# Patient Record
Sex: FEMALE | Employment: FULL TIME | URBAN - METROPOLITAN AREA
[De-identification: names, ages, dates, MRNs, and addresses within clinical notes are randomized per-mention and may not be internally consistent; named-entity substitution may affect disease eponyms.]

---

## 2018-01-25 ENCOUNTER — HOSPITAL ENCOUNTER (OUTPATIENT)
Dept: MAMMOGRAPHY | Age: 48
Discharge: HOME | End: 2018-01-25
Attending: FAMILY MEDICINE
Payer: COMMERCIAL

## 2018-01-25 DIAGNOSIS — Z12.31 SCREENING MAMMOGRAM, ENCOUNTER FOR: ICD-10-CM

## 2018-01-25 PROCEDURE — 77067 SCR MAMMO BI INCL CAD: CPT | Performed by: FAMILY MEDICINE

## 2018-01-25 PROCEDURE — 77063 BREAST TOMOSYNTHESIS BI: CPT | Performed by: FAMILY MEDICINE

## 2018-02-01 ENCOUNTER — HOSPITAL ENCOUNTER (OUTPATIENT)
Dept: MAMMOGRAPHY | Facility: HOSPITAL | Age: 48
Discharge: HOME | End: 2018-02-01
Attending: FAMILY MEDICINE
Payer: COMMERCIAL

## 2018-02-01 DIAGNOSIS — R92.2 INCONCLUSIVE MAMMOGRAM: ICD-10-CM

## 2018-02-01 PROCEDURE — 76642 ULTRASOUND BREAST LIMITED: CPT | Performed by: FAMILY MEDICINE

## 2018-02-01 PROCEDURE — 77065 DX MAMMO INCL CAD UNI: CPT | Performed by: FAMILY MEDICINE

## 2018-02-01 PROCEDURE — 77061 BREAST TOMOSYNTHESIS UNI: CPT | Performed by: FAMILY MEDICINE

## 2018-02-01 NOTE — IMAGING NOTE
Asssisted Dr. Karlee Montes with recommendation for a left stereotactic 2 site breast biopsy for calcifications. Emotional and educational support provided. Written information provided to [place].  Our breast center schedulers will call pt within 72 hours to

## 2018-02-08 ENCOUNTER — HOSPITAL ENCOUNTER (OUTPATIENT)
Dept: MAMMOGRAPHY | Facility: HOSPITAL | Age: 48
Discharge: HOME | End: 2018-02-08
Attending: FAMILY MEDICINE
Payer: COMMERCIAL

## 2018-02-08 DIAGNOSIS — R92.1 BREAST CALCIFICATIONS: ICD-10-CM

## 2018-02-08 PROCEDURE — 19082 BX BREAST ADD LESION STRTCTC: CPT | Performed by: FAMILY MEDICINE

## 2018-02-08 PROCEDURE — 19081 BX BREAST 1ST LESION STRTCTC: CPT | Performed by: FAMILY MEDICINE

## 2018-02-08 PROCEDURE — 88305 TISSUE EXAM BY PATHOLOGIST: CPT

## 2018-02-12 ENCOUNTER — TELEPHONE (OUTPATIENT)
Dept: MAMMOGRAPHY | Facility: HOSPITAL | Age: 48
End: 2018-02-12

## 2018-02-12 NOTE — TELEPHONE ENCOUNTER
Spoke with pt's ordering doctor regarding new breast pathology of ADH in 1 area sampled. The other site was negative. She will contact pt with results and refer her to a surgeon.

## 2018-03-02 ENCOUNTER — OFFICE VISIT (OUTPATIENT)
Dept: SURGERY | Facility: CLINIC | Age: 48
End: 2018-03-02

## 2018-03-02 VITALS
DIASTOLIC BLOOD PRESSURE: 85 MMHG | HEART RATE: 56 BPM | TEMPERATURE: 99 F | HEIGHT: 64.5 IN | RESPIRATION RATE: 20 BRPM | SYSTOLIC BLOOD PRESSURE: 157 MMHG | BODY MASS INDEX: 29.82 KG/M2 | OXYGEN SATURATION: 100 % | WEIGHT: 176.81 LBS

## 2018-03-02 DIAGNOSIS — N60.92 ATYPICAL DUCTAL HYPERPLASIA OF LEFT BREAST: Primary | ICD-10-CM

## 2018-03-02 PROCEDURE — 99245 OFF/OP CONSLTJ NEW/EST HI 55: CPT | Performed by: SURGERY

## 2018-03-12 ENCOUNTER — TELEPHONE (OUTPATIENT)
Dept: MAMMOGRAPHY | Facility: HOSPITAL | Age: 48
End: 2018-03-12

## 2018-03-12 NOTE — TELEPHONE ENCOUNTER
Phoned Kendall Dominguez regarding MAG SEED localization process of breast for lumpectomy scheduled for 3/15/18 with Dr. Zoë Brown. Procedure explained and all questions answered. Pt to be transported via W/C through [facility] to [facility] in [facility].  Pt verbalized u

## 2018-03-15 ENCOUNTER — HOSPITAL ENCOUNTER (OUTPATIENT)
Facility: HOSPITAL | Age: 48
Setting detail: HOSPITAL OUTPATIENT SURGERY
Discharge: HOME | End: 2018-03-15
Attending: SURGERY | Admitting: SURGERY
Payer: COMMERCIAL

## 2018-03-15 ENCOUNTER — ANESTHESIA EVENT (OUTPATIENT)
Dept: SURGERY | Facility: HOSPITAL | Age: 48
End: 2018-03-15

## 2018-03-15 ENCOUNTER — ANESTHESIA (OUTPATIENT)
Dept: SURGERY | Facility: HOSPITAL | Age: 48
End: 2018-03-15

## 2018-03-15 ENCOUNTER — HOSPITAL ENCOUNTER (OUTPATIENT)
Dept: MAMMOGRAPHY | Facility: HOSPITAL | Age: 48
Discharge: HOME | End: 2018-03-15
Attending: SURGERY
Payer: COMMERCIAL

## 2018-03-15 ENCOUNTER — SURGERY (OUTPATIENT)
Age: 48
End: 2018-03-15

## 2018-03-15 VITALS
TEMPERATURE: 98 F | RESPIRATION RATE: 20 BRPM | DIASTOLIC BLOOD PRESSURE: 56 MMHG | HEART RATE: 51 BPM | SYSTOLIC BLOOD PRESSURE: 112 MMHG | WEIGHT: 176.81 LBS | BODY MASS INDEX: 29.82 KG/M2 | HEIGHT: 64.5 IN | OXYGEN SATURATION: 100 %

## 2018-03-15 DIAGNOSIS — N60.99 ATYPICAL DUCTAL HYPERPLASIA OF BREAST: ICD-10-CM

## 2018-03-15 DIAGNOSIS — N60.92 ATYPICAL DUCTAL HYPERPLASIA OF LEFT BREAST: ICD-10-CM

## 2018-03-15 PROCEDURE — A4648 IMPLANTABLE TISSUE MARKER: HCPCS

## 2018-03-15 PROCEDURE — 88307 TISSUE EXAM BY PATHOLOGIST: CPT | Performed by: SURGERY

## 2018-03-15 PROCEDURE — 76098 X-RAY EXAM SURGICAL SPECIMEN: CPT | Performed by: SURGERY

## 2018-03-15 PROCEDURE — 0HBU0ZX EXCISION OF LEFT BREAST, OPEN APPROACH, DIAGNOSTIC: ICD-10-PCS | Performed by: SURGERY

## 2018-03-15 PROCEDURE — 19281 PERQ DEVICE BREAST 1ST IMAG: CPT | Performed by: SURGERY

## 2018-03-15 PROCEDURE — 81025 URINE PREGNANCY TEST: CPT | Performed by: SURGERY

## 2018-03-15 RX ORDER — CEFAZOLIN SODIUM/WATER 2 G/20 ML
2 SYRINGE (ML) INTRAVENOUS ONCE
Status: DISCONTINUED | OUTPATIENT
Start: 2018-03-15 | End: 2018-03-15 | Stop reason: HOSPADM

## 2018-03-15 RX ORDER — HYDROCODONE BITARTRATE AND ACETAMINOPHEN 5; 325 MG/1; MG/1
1-2 TABLET ORAL EVERY 6 HOURS PRN
Qty: 30 TABLET | Refills: 0 | Status: SHIPPED | OUTPATIENT
Start: 2018-03-15 | End: 2018-03-21 | Stop reason: ALTCHOICE

## 2018-03-15 RX ORDER — MORPHINE SULFATE 2 MG/ML
2 INJECTION, SOLUTION INTRAMUSCULAR; INTRAVENOUS EVERY 5 MIN PRN
Status: DISCONTINUED | OUTPATIENT
Start: 2018-03-15 | End: 2018-03-15

## 2018-03-15 RX ORDER — NALOXONE HYDROCHLORIDE 0.4 MG/ML
80 INJECTION, SOLUTION INTRAMUSCULAR; INTRAVENOUS; SUBCUTANEOUS AS NEEDED
Status: DISCONTINUED | OUTPATIENT
Start: 2018-03-15 | End: 2018-03-15

## 2018-03-15 RX ORDER — LIDOCAINE HYDROCHLORIDE AND EPINEPHRINE 10; 10 MG/ML; UG/ML
INJECTION, SOLUTION INFILTRATION; PERINEURAL AS NEEDED
Status: DISCONTINUED | OUTPATIENT
Start: 2018-03-15 | End: 2018-03-15 | Stop reason: HOSPADM

## 2018-03-15 RX ORDER — DEXAMETHASONE SODIUM PHOSPHATE 4 MG/ML
4 VIAL (ML) INJECTION AS NEEDED
Status: DISCONTINUED | OUTPATIENT
Start: 2018-03-15 | End: 2018-03-15

## 2018-03-15 RX ORDER — HYDROCODONE BITARTRATE AND ACETAMINOPHEN 5; 325 MG/1; MG/1
2 TABLET ORAL AS NEEDED
Status: DISCONTINUED | OUTPATIENT
Start: 2018-03-15 | End: 2018-03-15

## 2018-03-15 RX ORDER — BUPIVACAINE HYDROCHLORIDE 5 MG/ML
INJECTION, SOLUTION EPIDURAL; INTRACAUDAL AS NEEDED
Status: DISCONTINUED | OUTPATIENT
Start: 2018-03-15 | End: 2018-03-15 | Stop reason: HOSPADM

## 2018-03-15 RX ORDER — ONDANSETRON 2 MG/ML
4 INJECTION INTRAMUSCULAR; INTRAVENOUS AS NEEDED
Status: DISCONTINUED | OUTPATIENT
Start: 2018-03-15 | End: 2018-03-15

## 2018-03-15 RX ORDER — HYDROCODONE BITARTRATE AND ACETAMINOPHEN 5; 325 MG/1; MG/1
1 TABLET ORAL AS NEEDED
Status: DISCONTINUED | OUTPATIENT
Start: 2018-03-15 | End: 2018-03-15

## 2018-03-15 RX ORDER — SODIUM CHLORIDE, SODIUM LACTATE, POTASSIUM CHLORIDE, CALCIUM CHLORIDE 600; 310; 30; 20 MG/100ML; MG/100ML; MG/100ML; MG/100ML
INJECTION, SOLUTION INTRAVENOUS CONTINUOUS
Status: DISCONTINUED | OUTPATIENT
Start: 2018-03-15 | End: 2018-03-15

## 2018-03-15 RX ORDER — METOCLOPRAMIDE HYDROCHLORIDE 5 MG/ML
10 INJECTION INTRAMUSCULAR; INTRAVENOUS AS NEEDED
Status: DISCONTINUED | OUTPATIENT
Start: 2018-03-15 | End: 2018-03-15

## 2018-03-15 RX ORDER — DIAZEPAM 5 MG/1
5 TABLET ORAL AS NEEDED
Status: DISCONTINUED | OUTPATIENT
Start: 2018-03-15 | End: 2018-03-15 | Stop reason: HOSPADM

## 2018-03-15 NOTE — ANESTHESIA PREPROCEDURE EVALUATION
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

## 2018-03-15 NOTE — OPERATIVE REPORT
[facility]    PATIENT'S NAME: Sheryl BAUTISTA 40 PHYSICIAN: Ian Knutson M.D. OPERATING PHYSICIAN: Ian Schwarz. Roni Knutson M.D.    PATIENT ACCOUNT#:   [de-identified]    LOCATION:  [address] [place] [address] 8 EDWP 10  MEDICAL RECORD #:   [id number] 1% with epinephrine was used to infiltrate the skin and subcutaneous tissues at the incision site. A curvilinear superior areolar incision was made with a 15 blade knife in the skin.   Using a probe, the magnetic seed was identified and brought into the fi

## 2018-03-15 NOTE — PROGRESS NOTES
Breast Surgery New Patient Consultation    This is the first visit for this 56year old woman, referred by Dr. Lily Ferris, who presents for evaluation of abnormal imaging.     History of Present Illness:   Ms. Liz Rojo is a 56year old woman who presen Maternal Grandmother 66     colon   • Cancer Maternal Grandfather [de-identified]     prostate   • Cancer Paternal Grandfather 76     lung   • Cancer Paternal Uncle 71     colon   • Cancer Maternal Aunt 46     dcis       She is not of Ashkenazi Catholic ancestry.     Soci stream, blood in the urine or vaginal/penile discharge. Skin:    The patient denies rash, itching, skin lesions, dry skin, change in skin color or change in moles.      Hematologic/Lymphatic:  The patient denies easily bruising or bleeding or persistent breast: The skin, nipple ,and areola appear normal. There is no skin dimpling with movement of the pectoralis. There is no nipple retraction. No nipple discharge can be elicited. The parenchyma is mildly nodular.  There are no dominant masses in the breast. to proceed with surgical excision of the area to exclude these problems.  She was told that if no worse lesion is found upon surgical excision, we will discuss the risk implications of ADH at greater length at the post-operative visit once malignancy has be

## 2018-03-15 NOTE — H&P
Breast Surgery New Patient Consultation    History of Present Illness:   Ms. Selvin Bonilla is a 56year old woman who presents with eft breast ADH. History reviewed. No pertinent past medical history.     Past Surgical History:  No date: OTHER See history of present illness    Gastrointestinal:     There is no history of difficulty or pain with swallowing, reflux symptoms, vomiting, dark or bloody stools, constipation, yellowing of the skin, indigestion, nausea, change in bowel habits, diarrhea appropriate. HEENT: The head is normocephalic. The neck is supple. The thyroid is not enlarged and is without palpable masses/nodules. There are no palpable masses. The trachea is in the midline. Conjunctiva are clear, non-icteric.     Chest: The chest e patient that there was a 20-25% chance of associated DCIS and a 1-2% chance of an associated invasive cancer. This procedure was explained in detail and she agrees to proceed with surgical excision of the area to exclude these problems.     I have recommen

## 2018-03-15 NOTE — ANESTHESIA POSTPROCEDURE EVALUATION
[address] [facility] Patient Status:  Hospital Outpatient Surgery   Age/Gender 56year old female MRN [id number]   [facility] SURGERY Attending Colleen Rivera MD   Hosp Day # 0 PCP Kalpana PRIEST       Anesthesia Post-op Not

## 2018-03-15 NOTE — BRIEF OP NOTE
Pre-Operative Diagnosis: Atypical ductal hyperplasia of breast [N60.99]     Post-Operative Diagnosis: Atypical ductal hyperplasia of breast [N60.99]     Procedure Performed:   Procedure(s):  Left breast mag seed localized excisional biopsy    Surgeon(s)

## 2018-03-15 NOTE — IMAGING NOTE
Assisted Dr. Tiarra Agosto with [address] [place] [address] localization for breast for lumpectomy. Procedure explained and all questions answered. Pt verbalized understanding. Emotional support provided and pt tolerated procedure well with minimal discomfort.  Gauze dressing placed

## 2018-03-21 ENCOUNTER — OFFICE VISIT (OUTPATIENT)
Dept: SURGERY | Facility: CLINIC | Age: 48
End: 2018-03-21

## 2018-03-21 VITALS
DIASTOLIC BLOOD PRESSURE: 81 MMHG | SYSTOLIC BLOOD PRESSURE: 153 MMHG | OXYGEN SATURATION: 100 % | BODY MASS INDEX: 29.68 KG/M2 | WEIGHT: 176 LBS | HEART RATE: 50 BPM | RESPIRATION RATE: 18 BRPM | HEIGHT: 64.5 IN

## 2018-03-21 DIAGNOSIS — N60.92 ATYPICAL DUCTAL HYPERPLASIA OF LEFT BREAST: Primary | ICD-10-CM

## 2018-03-21 PROCEDURE — 99024 POSTOP FOLLOW-UP VISIT: CPT | Performed by: SURGERY

## 2018-03-21 NOTE — PROGRESS NOTES
Breast Surgery Post-Operative Visit    Diagnosis: Left breast atypical ductal hyperplasia status post surgical excision on March 15, 2018.     Stage: Not applicable    Disease Status:  Surgical treatment complete, high-risk surveillance and chemoprevention interventions, we discussed lifestyle modifications including attention to diet, exercise, weight control, moderation in alcohol use, and avoidance of long-term hormone replacement therapy in the future.   We also discussed the benefit of a cumulative time

## 2018-06-08 RX ORDER — SODIUM CHLORIDE, SODIUM LACTATE, POTASSIUM CHLORIDE, CALCIUM CHLORIDE 600; 310; 30; 20 MG/100ML; MG/100ML; MG/100ML; MG/100ML
INJECTION, SOLUTION INTRAVENOUS CONTINUOUS
Status: CANCELLED | OUTPATIENT
Start: 2018-06-08

## 2018-06-08 NOTE — H&P
[address]    PATIENT'S NAME: To Moritz   ATTENDING PHYSICIAN: Jerome De Jesus M.D.    PATIENT ACCOUNT#:   [de-identified]    LOCATION:  [facility]  MEDICAL RECORD #:   [id number]       YOB: 1970  ADMISSION DATE:       06/12/2018    H Tino Adam. MEDICATIONS:  Multivitamins. ALLERGIES:  No known drug allergies. SOCIAL HISTORY:  Occasional EtOH. No tobacco, no drugs. PHYSICAL EXAMINATION:    VITAL SIGNS:  The patient is 5 feet 5 inches. She weighs approximately 175 pounds.   Blo

## 2018-06-11 ENCOUNTER — ANESTHESIA EVENT (OUTPATIENT)
Dept: SURGERY | Facility: HOSPITAL | Age: 48
End: 2018-06-11
Payer: COMMERCIAL

## 2018-06-11 NOTE — ANESTHESIA PREPROCEDURE EVALUATION
PRE-OP EVALUATION    Patient Name: Chinmay Lay    Pre-op Diagnosis: EXCESSIVE MENSTRUATION, ABNORMAL UERINE BLEEDING, THICKENED ENDOMETRIUM    Procedure(s):   HYSTEROSCOPY, DILATION AND CURETTAGE    Surgeon(s) and Role:     Linn Anaya MD - Aron Harper taken beta blockers in last 24 hours. Post-procedure pain management plan discussed with surgeon and patient.     Comment: MAC with bkup GA  Risks and benefits of GA including sorethroat,allergy,nausea, vomiting,dental trauma,pain management modalities,tra

## 2018-06-12 ENCOUNTER — HOSPITAL ENCOUNTER (OUTPATIENT)
Facility: HOSPITAL | Age: 48
Setting detail: HOSPITAL OUTPATIENT SURGERY
Discharge: HOME | End: 2018-06-12
Attending: OBSTETRICS & GYNECOLOGY | Admitting: OBSTETRICS & GYNECOLOGY
Payer: COMMERCIAL

## 2018-06-12 ENCOUNTER — ANESTHESIA (OUTPATIENT)
Dept: SURGERY | Facility: HOSPITAL | Age: 48
End: 2018-06-12
Payer: COMMERCIAL

## 2018-06-12 ENCOUNTER — SURGERY (OUTPATIENT)
Age: 48
End: 2018-06-12

## 2018-06-12 VITALS
BODY MASS INDEX: 30.14 KG/M2 | RESPIRATION RATE: 16 BRPM | HEART RATE: 51 BPM | SYSTOLIC BLOOD PRESSURE: 110 MMHG | HEIGHT: 64 IN | TEMPERATURE: 98 F | OXYGEN SATURATION: 97 % | DIASTOLIC BLOOD PRESSURE: 60 MMHG | WEIGHT: 176.56 LBS

## 2018-06-12 PROCEDURE — 88305 TISSUE EXAM BY PATHOLOGIST: CPT | Performed by: OBSTETRICS & GYNECOLOGY

## 2018-06-12 PROCEDURE — 81025 URINE PREGNANCY TEST: CPT | Performed by: OBSTETRICS & GYNECOLOGY

## 2018-06-12 PROCEDURE — 0UB98ZX EXCISION OF UTERUS, VIA NATURAL OR ARTIFICIAL OPENING ENDOSCOPIC, DIAGNOSTIC: ICD-10-PCS | Performed by: OBSTETRICS & GYNECOLOGY

## 2018-06-12 PROCEDURE — 0UDB7ZZ EXTRACTION OF ENDOMETRIUM, VIA NATURAL OR ARTIFICIAL OPENING: ICD-10-PCS | Performed by: OBSTETRICS & GYNECOLOGY

## 2018-06-12 RX ORDER — HYDROMORPHONE HYDROCHLORIDE 1 MG/ML
0.4 INJECTION, SOLUTION INTRAMUSCULAR; INTRAVENOUS; SUBCUTANEOUS EVERY 5 MIN PRN
Status: DISCONTINUED | OUTPATIENT
Start: 2018-06-12 | End: 2018-06-12

## 2018-06-12 RX ORDER — ACETAMINOPHEN 500 MG
1000 TABLET ORAL EVERY 6 HOURS PRN
COMMUNITY
End: 2018-10-23 | Stop reason: ALTCHOICE

## 2018-06-12 RX ORDER — SODIUM CHLORIDE, SODIUM LACTATE, POTASSIUM CHLORIDE, CALCIUM CHLORIDE 600; 310; 30; 20 MG/100ML; MG/100ML; MG/100ML; MG/100ML
INJECTION, SOLUTION INTRAVENOUS CONTINUOUS
Status: DISCONTINUED | OUTPATIENT
Start: 2018-06-12 | End: 2018-06-12

## 2018-06-12 RX ORDER — ACETAMINOPHEN 500 MG
1000 TABLET ORAL ONCE AS NEEDED
Status: DISCONTINUED | OUTPATIENT
Start: 2018-06-12 | End: 2018-06-12

## 2018-06-12 RX ORDER — LIDOCAINE HYDROCHLORIDE 10 MG/ML
INJECTION, SOLUTION INFILTRATION; PERINEURAL AS NEEDED
Status: DISCONTINUED | OUTPATIENT
Start: 2018-06-12 | End: 2018-06-12 | Stop reason: HOSPADM

## 2018-06-12 RX ORDER — CEFAZOLIN SODIUM/WATER 2 G/20 ML
2 SYRINGE (ML) INTRAVENOUS ONCE
Status: COMPLETED | OUTPATIENT
Start: 2018-06-12 | End: 2018-06-12

## 2018-06-12 RX ORDER — NALOXONE HYDROCHLORIDE 0.4 MG/ML
80 INJECTION, SOLUTION INTRAMUSCULAR; INTRAVENOUS; SUBCUTANEOUS AS NEEDED
Status: DISCONTINUED | OUTPATIENT
Start: 2018-06-12 | End: 2018-06-12

## 2018-06-12 RX ORDER — ACETAMINOPHEN 500 MG
1000 TABLET ORAL ONCE
Status: DISCONTINUED | OUTPATIENT
Start: 2018-06-12 | End: 2018-06-12 | Stop reason: HOSPADM

## 2018-06-12 NOTE — BRIEF OP NOTE
[facility]  Post-Op Procedure Note    [facility] Patient Status:  Hospital Outpatient Surgery    1970 MRN [id number]   Location [address] [place] [address] Attending Sunita Tsai MD   [facility] Day # 0 PCP Jojo Poon

## 2018-06-12 NOTE — ANESTHESIA POSTPROCEDURE EVALUATION
[address] [facility] Patient Status:  Hospital Outpatient Surgery   Age/Gender 56year old female MRN [id number]   Location [address] [place] [address] Attending Adilene Jacobs MD   [facility] Day # 0 GLORIA Amaro

## 2018-06-12 NOTE — INTERVAL H&P NOTE
Pre-op Diagnosis: EXCESSIVE MENSTRUATION, ABNORMAL UERINE BLEEDING, THICKENED ENDOMETRIUM    The above referenced H&P was reviewed by Luis Felipe Bruner MD on 6/12/2018, the patient was examined and no significant changes have occurred in the patient's condi

## 2018-06-12 NOTE — OPERATIVE REPORT
[address]    PATIENT'S NAME: Blake Rosario   ATTENDING PHYSICIAN: Bunny Brambila M.D. OPERATING PHYSICIAN: Bunny Brambila M.D.    PATIENT ACCOUNT#:   [de-identified]    LOCATION:  [address] [place] [address] 10  MEDICAL RECORD #:   [id number] performed. The uterus was sounded to 8 cm. Input for the procedure was 400 mL of IV fluid. Hysteroscopic deficit was 10 mL. The patient tolerated the procedure well and she went to same-day surgery in stable condition.   The patient should do well posto

## 2018-09-21 ENCOUNTER — HOSPITAL ENCOUNTER (OUTPATIENT)
Dept: MAMMOGRAPHY | Facility: HOSPITAL | Age: 48
Discharge: HOME | End: 2018-09-21
Attending: SURGERY
Payer: COMMERCIAL

## 2018-09-21 DIAGNOSIS — N60.92 ATYPICAL DUCTAL HYPERPLASIA OF LEFT BREAST: ICD-10-CM

## 2018-09-21 PROCEDURE — 77065 DX MAMMO INCL CAD UNI: CPT | Performed by: SURGERY

## 2018-09-21 PROCEDURE — 77061 BREAST TOMOSYNTHESIS UNI: CPT | Performed by: SURGERY

## 2018-09-25 ENCOUNTER — OFFICE VISIT (OUTPATIENT)
Dept: FAMILY MEDICINE CLINIC | Facility: CLINIC | Age: 48
End: 2018-09-25
Payer: COMMERCIAL

## 2018-09-25 VITALS
DIASTOLIC BLOOD PRESSURE: 84 MMHG | HEIGHT: 64.5 IN | WEIGHT: 172 LBS | RESPIRATION RATE: 16 BRPM | TEMPERATURE: 98 F | OXYGEN SATURATION: 98 % | BODY MASS INDEX: 29.01 KG/M2 | SYSTOLIC BLOOD PRESSURE: 120 MMHG | HEART RATE: 50 BPM

## 2018-09-25 DIAGNOSIS — Z02.89 ENCOUNTER FOR PHYSICAL EXAMINATION RELATED TO EMPLOYMENT: ICD-10-CM

## 2018-09-25 DIAGNOSIS — Z11.1 SCREENING FOR TUBERCULOSIS: Primary | ICD-10-CM

## 2018-09-25 PROCEDURE — 86580 TB INTRADERMAL TEST: CPT | Performed by: NURSE PRACTITIONER

## 2018-09-25 PROCEDURE — 99396 PREV VISIT EST AGE 40-64: CPT | Performed by: NURSE PRACTITIONER

## 2018-09-25 NOTE — PROGRESS NOTES
Nicci Whitten is a 56year old female  presents for a pre-employment physical. Pt is not going to participate in sports. Current Outpatient Medications:  acetaminophen 500 MG Oral Tab Take 1,000 mg by mouth every 6 (six) hours as needed for Pain.  Tatiana Zhong tuberculosis  (primary encounter diagnosis)  Encounter for physical examination related to employment    PLAN:  Cleared for employment pending PPD reading

## 2018-09-25 NOTE — PATIENT INSTRUCTIONS
You will need to return to clinic in 48-72 hours to have results of TB test read. Please return to clinic on Thursday, September 27 between 10:00 AM and 7:00 PM or on Friday, September 28, 2018 between 8:00 AM and 10:00 AM to have your TB test read.

## 2018-09-27 ENCOUNTER — OFFICE VISIT (OUTPATIENT)
Dept: FAMILY MEDICINE CLINIC | Facility: CLINIC | Age: 48
End: 2018-09-27

## 2018-09-27 DIAGNOSIS — Z09 FOLLOW UP: Primary | ICD-10-CM

## 2018-09-27 LAB — INDURATION (): 0 MM (ref 0–11)

## 2018-10-23 ENCOUNTER — OFFICE VISIT (OUTPATIENT)
Dept: SURGERY | Facility: CLINIC | Age: 48
End: 2018-10-23
Payer: COMMERCIAL

## 2018-10-23 VITALS
OXYGEN SATURATION: 100 % | BODY MASS INDEX: 28.67 KG/M2 | HEIGHT: 64.5 IN | SYSTOLIC BLOOD PRESSURE: 139 MMHG | DIASTOLIC BLOOD PRESSURE: 79 MMHG | RESPIRATION RATE: 18 BRPM | WEIGHT: 170 LBS | HEART RATE: 47 BPM

## 2018-10-23 DIAGNOSIS — N60.92 ATYPICAL DUCTAL HYPERPLASIA OF LEFT BREAST: Primary | ICD-10-CM

## 2018-10-23 PROCEDURE — 99214 OFFICE O/P EST MOD 30 MIN: CPT | Performed by: SURGERY

## 2018-10-24 NOTE — PROGRESS NOTES
Breast Surgery Surveillance Visit    Diagnosis: Left breast atypical ductal hyperplasia status post surgical excision on March 15, 2018.     Stage: Not applicable    Disease Status:  Surgical treatment complete, high-risk surveillance ongoing and chemopreve • Cancer Paternal Uncle 71        colon   • Cancer Maternal Aunt 46        dcis       She is not of Ashkenazi Judaism ancestry.     Social History:    Alcohol use Yes   Comment: OCCAS 1 GLASS WINE EVERY 3 WEEKS         Smoking status: Never Smoker   Smokel skin, change in skin color or change in moles. Hematologic/Lymphatic:  The patient denies easily bruising or bleeding or persistent swollen glands or lymph nodes.      Musculoskeletal:  The patient denies muscle aches/pain, joint pain, stiff joints, Jeremi Suffern can be elicited. The parenchyma is mildly nodular. There are no dominant masses in the breast. The axillary tail is normal.  Left breast:   The skin, nipple, and areola appear normal. There is no skin dimpling with movement of the pectoralis.  There is no n risk of breast cancer greater than 20%, we may consider implementing high risk MRI into her surveillance plan at some point as per ACS recommendations.        With regard to risk-reducing interventions, we discussed lifestyle modifications including attenti

## 2019-03-25 ENCOUNTER — HOSPITAL ENCOUNTER (OUTPATIENT)
Dept: MAMMOGRAPHY | Facility: HOSPITAL | Age: 49
Discharge: HOME | End: 2019-03-25
Attending: SURGERY
Payer: COMMERCIAL

## 2019-03-25 DIAGNOSIS — N60.92 ATYPICAL DUCTAL HYPERPLASIA OF LEFT BREAST: ICD-10-CM

## 2019-03-25 PROCEDURE — 77062 BREAST TOMOSYNTHESIS BI: CPT | Performed by: SURGERY

## 2019-03-25 PROCEDURE — 77066 DX MAMMO INCL CAD BI: CPT | Performed by: SURGERY

## 2019-04-18 ENCOUNTER — TELEPHONE (OUTPATIENT)
Dept: SURGERY | Facility: CLINIC | Age: 49
End: 2019-04-18

## 2019-04-19 ENCOUNTER — OFFICE VISIT (OUTPATIENT)
Dept: SURGERY | Facility: CLINIC | Age: 49
End: 2019-04-19
Payer: COMMERCIAL

## 2019-04-19 DIAGNOSIS — N60.92 ATYPICAL DUCTAL HYPERPLASIA OF LEFT BREAST: Primary | ICD-10-CM

## 2019-04-19 PROCEDURE — 99214 OFFICE O/P EST MOD 30 MIN: CPT | Performed by: CLINICAL NURSE SPECIALIST

## 2019-04-20 VITALS
HEART RATE: 86 BPM | SYSTOLIC BLOOD PRESSURE: 128 MMHG | RESPIRATION RATE: 22 BRPM | TEMPERATURE: 99 F | DIASTOLIC BLOOD PRESSURE: 78 MMHG

## 2019-04-20 NOTE — PROGRESS NOTES
Breast Surgery Surveillance Visit    Diagnosis: Left breast atypical ductal hyperplasia status post surgical excision on March 15, 2018.     Stage: Not applicable    Disease Status:  Surgical treatment complete, high-risk surveillance ongoing and chemopreve Cancer Maternal Grandmother 66        colon   • Cancer Maternal Grandfather [de-identified]        prostate   • Cancer Paternal Grandfather 76        lung   • Cancer Paternal Uncle 71        colon   • Cancer Maternal Aunt 46        dcis       She is not of \[facility] [place] [facility]\"" ([facility]) incontinence, decreased urine stream, blood in the urine or vaginal/penile discharge. Skin:    The patient denies rash, itching, skin lesions, dry skin, change in skin color or change in moles.      Hematologic/Lymphatic:  The patient denies easily bruis with movement of the pectoralis. There is no nipple retraction. No nipple discharge can be elicited. The parenchyma is mildly nodular.  There are no dominant masses in the breast. The axillary tail is normal.  Left breast:   The skin, nipple, and areola rovreto talked about surveillance and I recommended semiannual breast exams as well as continuing with annual mammography.   The  mammogram on March 25 showed persistent scattered calcifications within the left breast and a six month left breast follow up mammogram physical therapy may be warranted in the future if she identifies any limitations or restrictions. She was encouraged to contact the office with any questions or concerns prior to her next scheduled appointment.      This encounter lasted a total of 25 matthew

## 2019-10-08 NOTE — PROGRESS NOTES
Breast Surgery Surveillance Visit    Diagnosis: Left fibrocystic change including sclerosing adenosis,  apocrine metaplasia and columnar cell change with microcalcifications status post left breast excision on March 15, 2018.     Stage: Not applicable    Slim Soria History:   Diagnosis Date   • Depression        Past Surgical History:   Procedure Laterality Date   • BENIGN BIOPSY LEFT     • BREAST BIOPSY Left 03/2018    pre-cancerous cells removed   • BREAST BIOPSY NEEDLE LOCALIZATION Left 3/15/2018    Performed by Stacia Wilson or weight loss. HEENT:     The patient denies eye irritation, cataracts, redness, glaucoma, yellowing of the eyes, change in vision or color blindness.  The patient denies hearing loss, ringing in the ears, ear drainage, earaches, nasal congestion, nose loss of sensation/numbness, problems walking, weakness, tingling or burning in hands/feet. There is no history of abusive relationship, bipolar disorder, sleep disturbance, anxiety, depression or feeling of despair. Endocrine:     There is no history of masses. Abdomen: The abdomen is soft, flat and non tender. The liver is not enlarged. There are no palpable masses. Lymph Nodes: The supraclavicular, axillary and cervical regions are free of significant lymphadenopathy.     Back: There is no verteb cumulative time of eighteen months or more of breastfeeding. We then touched upon the utility of chemoprevention.  I reviewed that the FDA approved the use of Tamoxifen for breast cancer risk reduction in premenopausal women at increased risk for breast

## 2019-10-10 ENCOUNTER — HOSPITAL ENCOUNTER (OUTPATIENT)
Dept: MAMMOGRAPHY | Facility: HOSPITAL | Age: 49
Discharge: HOME | End: 2019-10-10
Attending: SURGERY
Payer: COMMERCIAL

## 2019-10-10 DIAGNOSIS — N60.92 ATYPICAL DUCTAL HYPERPLASIA OF LEFT BREAST: ICD-10-CM

## 2019-10-10 PROCEDURE — 77061 BREAST TOMOSYNTHESIS UNI: CPT | Performed by: SURGERY

## 2019-10-10 PROCEDURE — 77065 DX MAMMO INCL CAD UNI: CPT | Performed by: SURGERY

## 2019-10-11 ENCOUNTER — OFFICE VISIT (OUTPATIENT)
Dept: SURGERY | Facility: CLINIC | Age: 49
End: 2019-10-11
Payer: COMMERCIAL

## 2019-10-11 VITALS
HEART RATE: 45 BPM | WEIGHT: 164 LBS | SYSTOLIC BLOOD PRESSURE: 138 MMHG | HEIGHT: 64 IN | OXYGEN SATURATION: 100 % | BODY MASS INDEX: 28 KG/M2 | RESPIRATION RATE: 16 BRPM | DIASTOLIC BLOOD PRESSURE: 81 MMHG

## 2019-10-11 DIAGNOSIS — R92.1 BREAST CALCIFICATION, LEFT: Primary | ICD-10-CM

## 2019-10-11 PROCEDURE — 99214 OFFICE O/P EST MOD 30 MIN: CPT | Performed by: SURGERY

## 2019-10-11 RX ORDER — VITAMIN E 268 MG
1000 CAPSULE ORAL DAILY
COMMUNITY

## 2020-03-24 ENCOUNTER — TELEPHONE (OUTPATIENT)
Dept: SURGERY | Facility: CLINIC | Age: 50
End: 2020-03-24

## 2020-03-24 NOTE — TELEPHONE ENCOUNTER
Returned pt call regarding her upcoming mammogram on 4/20.   Pt was wondering about going over the results and a visit for a breast exam.  Set up an appt with the pt for 5/5 @ 3:30  Told her that at the time of that appt we could go over the results and blaze

## 2020-04-20 ENCOUNTER — HOSPITAL ENCOUNTER (OUTPATIENT)
Dept: MAMMOGRAPHY | Facility: HOSPITAL | Age: 50
Discharge: HOME | End: 2020-04-20
Attending: SURGERY
Payer: COMMERCIAL

## 2020-04-20 DIAGNOSIS — R92.1 BREAST CALCIFICATION, LEFT: ICD-10-CM

## 2020-04-20 PROCEDURE — 77062 BREAST TOMOSYNTHESIS BI: CPT | Performed by: SURGERY

## 2020-04-20 PROCEDURE — 77066 DX MAMMO INCL CAD BI: CPT | Performed by: SURGERY

## 2020-06-19 ENCOUNTER — OFFICE VISIT (OUTPATIENT)
Dept: SURGERY | Facility: CLINIC | Age: 50
End: 2020-06-19
Payer: COMMERCIAL

## 2020-06-19 VITALS
WEIGHT: 163.81 LBS | BODY MASS INDEX: 27.96 KG/M2 | RESPIRATION RATE: 18 BRPM | DIASTOLIC BLOOD PRESSURE: 81 MMHG | OXYGEN SATURATION: 100 % | SYSTOLIC BLOOD PRESSURE: 148 MMHG | HEIGHT: 64 IN | HEART RATE: 53 BPM | TEMPERATURE: 99 F

## 2020-06-19 DIAGNOSIS — R92.1 BREAST CALCIFICATION, LEFT: ICD-10-CM

## 2020-06-19 DIAGNOSIS — N60.92 ATYPICAL DUCTAL HYPERPLASIA OF LEFT BREAST: Primary | ICD-10-CM

## 2020-06-19 PROCEDURE — 3077F SYST BP >= 140 MM HG: CPT | Performed by: SURGERY

## 2020-06-19 PROCEDURE — 99214 OFFICE O/P EST MOD 30 MIN: CPT | Performed by: SURGERY

## 2020-06-19 PROCEDURE — 3079F DIAST BP 80-89 MM HG: CPT | Performed by: SURGERY

## 2020-06-19 PROCEDURE — 3008F BODY MASS INDEX DOCD: CPT | Performed by: SURGERY

## 2020-06-19 NOTE — PROGRESS NOTES
Breast Surgery Surveillance Visit    Diagnosis: Left fibrocystic change including sclerosing adenosis,  apocrine metaplasia and columnar cell change with microcalcifications status post left breast excision on March 15, 2018.     Stage: Not applicable    Kylah Daniel Date   • Depression        Past Surgical History:   Procedure Laterality Date   • BENIGN BIOPSY LEFT     • BREAST BIOPSY Left 03/2018    pre-cancerous cells removed   • BREAST BIOPSY NEEDLE LOCALIZATION Left 3/15/2018    Performed by Adilia Smith MD loss.    HEENT:     The patient denies eye irritation, cataracts, redness, glaucoma, yellowing of the eyes, change in vision or color blindness.  The patient denies hearing loss, ringing in the ears, ear drainage, earaches, nasal congestion, nose bleeds, sn sensation/numbness, problems walking, weakness, tingling or burning in hands/feet. There is no history of abusive relationship, bipolar disorder, sleep disturbance, anxiety, depression or feeling of despair. Endocrine:     There is no history of poor/slo incision with no underlying palpable masses. Abdomen: The abdomen is soft, flat and non tender. The liver is not enlarged. There are no palpable masses. Lymph Nodes:   The supraclavicular, axillary and cervical regions are free of significant lymphad also discussed the benefit of a cumulative time of eighteen months or more of breastfeeding. We then touched upon the utility of chemoprevention.  I reviewed that the FDA approved the use of Tamoxifen for breast cancer risk reduction in premenopausal wom

## 2020-11-16 ENCOUNTER — HOSPITAL ENCOUNTER (OUTPATIENT)
Age: 50
Discharge: HOME | End: 2020-11-16
Payer: COMMERCIAL

## 2020-11-16 VITALS
TEMPERATURE: 100 F | HEIGHT: 64 IN | OXYGEN SATURATION: 100 % | RESPIRATION RATE: 16 BRPM | DIASTOLIC BLOOD PRESSURE: 74 MMHG | HEART RATE: 50 BPM | BODY MASS INDEX: 27.31 KG/M2 | WEIGHT: 160 LBS | SYSTOLIC BLOOD PRESSURE: 165 MMHG

## 2020-11-16 DIAGNOSIS — Z20.822 EXPOSURE TO COVID-19 VIRUS: Primary | ICD-10-CM

## 2020-11-16 PROCEDURE — 99213 OFFICE O/P EST LOW 20 MIN: CPT

## 2020-11-16 PROCEDURE — 99203 OFFICE O/P NEW LOW 30 MIN: CPT

## 2020-11-16 NOTE — ED PROVIDER NOTES
Patient Seen in: [facility] [place]      History   Patient presents with:  Testing    Stated Complaint: test,exposed,no symptoms    46-year-old female presents to the immediate care with a cold exposure.   Patient was exposed to her coworker at [employer] and vital signs reviewed. All other systems reviewed and negative except as noted above.     Physical Exam     ED Triage Vitals [11/16/20 1613]   BP (!) 165/74   Pulse 50   Resp 16   Temp 99.5 °F (37.5 °C)   Temp src Temporal   SpO2 100 %   O2 Device N this time.                  Disposition and Plan     Clinical Impression:  Exposure to COVID-19 virus  (primary encounter diagnosis)    Disposition:  Discharge  11/16/2020  4:34 pm    Follow-up:  Marichuy Luna

## 2020-11-16 NOTE — ED INITIAL ASSESSMENT (HPI)
Pt presents today for covid testing. Pt states that her coworker tested + for covid. Pt denies any symptoms at this time.

## 2021-02-10 DIAGNOSIS — Z23 NEED FOR VACCINATION: ICD-10-CM

## 2021-03-08 ENCOUNTER — IMMUNIZATION (OUTPATIENT)
Dept: LAB | Age: 51
End: 2021-03-08
Attending: HOSPITALIST
Payer: COMMERCIAL

## 2021-03-08 DIAGNOSIS — Z23 NEED FOR VACCINATION: Primary | ICD-10-CM

## 2021-03-08 PROCEDURE — 0001A SARSCOV2 VAC 30MCG/0.3ML IM: CPT

## 2021-03-24 ENCOUNTER — TELEPHONE (OUTPATIENT)
Dept: SURGERY | Facility: CLINIC | Age: 51
End: 2021-03-24

## 2021-03-24 NOTE — TELEPHONE ENCOUNTER
Attempted to call pt back regarding her covid vaccine and mammogram.  No answer. LVM after verifying verbal release. Informed pt that our radiologists recommend waiting 30 days after 2nd dose of vaccine to have imaging if no current concerns.   Provided of

## 2021-03-29 ENCOUNTER — IMMUNIZATION (OUTPATIENT)
Dept: LAB | Age: 51
End: 2021-03-29
Attending: HOSPITALIST
Payer: COMMERCIAL

## 2021-03-29 DIAGNOSIS — Z23 NEED FOR VACCINATION: Primary | ICD-10-CM

## 2021-03-29 PROCEDURE — 0002A SARSCOV2 VAC 30MCG/0.3ML IM: CPT

## 2021-04-28 ENCOUNTER — HOSPITAL ENCOUNTER (OUTPATIENT)
Dept: MAMMOGRAPHY | Facility: HOSPITAL | Age: 51
Discharge: HOME | End: 2021-04-28
Attending: SURGERY
Payer: COMMERCIAL

## 2021-04-28 DIAGNOSIS — N60.92 ATYPICAL DUCTAL HYPERPLASIA OF LEFT BREAST: ICD-10-CM

## 2021-04-28 DIAGNOSIS — R92.1 BREAST CALCIFICATION, LEFT: ICD-10-CM

## 2021-04-28 PROCEDURE — 77066 DX MAMMO INCL CAD BI: CPT | Performed by: SURGERY

## 2021-04-28 PROCEDURE — 77062 BREAST TOMOSYNTHESIS BI: CPT | Performed by: SURGERY

## 2021-06-14 NOTE — PROGRESS NOTES
Breast Surgery Surveillance Visit    Diagnosis: Left fibrocystic change including sclerosing adenosis,  apocrine metaplasia and columnar cell change with microcalcifications status post left breast excision on March 15, 2018.     Stage: Not applicable    Christiano Boyle Date   • Depression        Past Surgical History:   Procedure Laterality Date   • BENIGN BIOPSY LEFT     • BREAST BIOPSY Left 03/2018    pre-cancerous cells removed   • BARRERA BIOPSY STEREO NODULE 1 SITE LEFT (CPT=19081)  02/2018    ADH   • BARRERA BIOPSY STEREO nose bleeds, snoring, pain in mouth/throat, hoarseness, change in voice, facial trauma.     Respiratory:  The patient denies chronic cough, phlegm, hemoptysis, pleurisy/chest pain, pneumonia, asthma, wheezing, difficulty in breathing with exertion, emphysem of poor/slow wound healing, weight loss/gain, fertility or hormone problems, cold intolerance, thyroid disease. Allergic/Immunologic:  There is no history of hives, hay fever, angioedema or anaphylaxis.     /75 (BP Location: Left arm, Patient Posi lymphadenopathy. Back: There is no vertebral column tenderness. Skin: The skin appears normal. There are no suspicious appearing rashes or lesions. Extremities: The extremities are without deformity, cyanosis or edema.     Impression:   50year-old premenopausal women at increased risk for breast cancer based upon the results of the NSABP Breast Cancer Prevention Trial in 1998.  The criteria for inclusion included a 5 year risk of breast cancer greater than 1.67% based on the [facility] which in order

## 2021-06-15 ENCOUNTER — OFFICE VISIT (OUTPATIENT)
Dept: SURGERY | Facility: CLINIC | Age: 51
End: 2021-06-15
Payer: COMMERCIAL

## 2021-06-15 VITALS
OXYGEN SATURATION: 100 % | BODY MASS INDEX: 26.53 KG/M2 | SYSTOLIC BLOOD PRESSURE: 118 MMHG | HEIGHT: 64 IN | RESPIRATION RATE: 18 BRPM | HEART RATE: 52 BPM | WEIGHT: 155.38 LBS | DIASTOLIC BLOOD PRESSURE: 75 MMHG

## 2021-06-15 DIAGNOSIS — R92.1 BREAST CALCIFICATION, LEFT: ICD-10-CM

## 2021-06-15 DIAGNOSIS — Z12.39 BREAST CANCER SCREENING, HIGH RISK PATIENT: ICD-10-CM

## 2021-06-15 DIAGNOSIS — N60.92 ATYPICAL DUCTAL HYPERPLASIA OF LEFT BREAST: Primary | ICD-10-CM

## 2021-06-15 DIAGNOSIS — Z12.31 ENCOUNTER FOR SCREENING MAMMOGRAM FOR HIGH-RISK PATIENT: ICD-10-CM

## 2021-06-15 PROCEDURE — 3008F BODY MASS INDEX DOCD: CPT | Performed by: SURGERY

## 2021-06-15 PROCEDURE — 3074F SYST BP LT 130 MM HG: CPT | Performed by: SURGERY

## 2021-06-15 PROCEDURE — 3078F DIAST BP <80 MM HG: CPT | Performed by: SURGERY

## 2021-06-15 PROCEDURE — 99214 OFFICE O/P EST MOD 30 MIN: CPT | Performed by: SURGERY

## 2022-05-06 ENCOUNTER — HOSPITAL ENCOUNTER (OUTPATIENT)
Dept: MAMMOGRAPHY | Age: 52
Discharge: HOME | End: 2022-05-06
Attending: SURGERY
Payer: COMMERCIAL

## 2022-05-06 DIAGNOSIS — Z12.39 BREAST CANCER SCREENING, HIGH RISK PATIENT: ICD-10-CM

## 2022-05-06 DIAGNOSIS — Z12.31 ENCOUNTER FOR SCREENING MAMMOGRAM FOR HIGH-RISK PATIENT: ICD-10-CM

## 2022-05-06 PROCEDURE — 77063 BREAST TOMOSYNTHESIS BI: CPT | Performed by: SURGERY

## 2022-05-06 PROCEDURE — 77067 SCR MAMMO BI INCL CAD: CPT | Performed by: SURGERY

## 2022-05-09 ENCOUNTER — LAB ENCOUNTER (OUTPATIENT)
Dept: LAB | Facility: HOSPITAL | Age: 52
End: 2022-05-09
Attending: FAMILY MEDICINE
Payer: COMMERCIAL

## 2022-05-09 DIAGNOSIS — N39.0 UTI (URINARY TRACT INFECTION): Primary | ICD-10-CM

## 2022-05-09 LAB
BILIRUB UR QL STRIP.AUTO: NEGATIVE
COLOR UR AUTO: YELLOW
GLUCOSE UR STRIP.AUTO-MCNC: NEGATIVE MG/DL
KETONES UR STRIP.AUTO-MCNC: NEGATIVE MG/DL
NITRITE UR QL STRIP.AUTO: NEGATIVE
PH UR STRIP.AUTO: 5 [PH] (ref 5–8)
PROT UR STRIP.AUTO-MCNC: NEGATIVE MG/DL
RBC UR QL AUTO: NEGATIVE
SP GR UR STRIP.AUTO: 1.01 (ref 1–1.03)
UROBILINOGEN UR STRIP.AUTO-MCNC: <2 MG/DL

## 2022-05-09 PROCEDURE — 87086 URINE CULTURE/COLONY COUNT: CPT

## 2022-05-09 PROCEDURE — 81001 URINALYSIS AUTO W/SCOPE: CPT

## 2022-06-07 ENCOUNTER — OFFICE VISIT (OUTPATIENT)
Dept: SURGERY | Facility: CLINIC | Age: 52
End: 2022-06-07
Payer: COMMERCIAL

## 2022-06-07 VITALS
HEART RATE: 53 BPM | TEMPERATURE: 98 F | SYSTOLIC BLOOD PRESSURE: 145 MMHG | RESPIRATION RATE: 18 BRPM | WEIGHT: 146 LBS | DIASTOLIC BLOOD PRESSURE: 75 MMHG | BODY MASS INDEX: 25 KG/M2 | OXYGEN SATURATION: 95 %

## 2022-06-07 DIAGNOSIS — R92.1 BREAST CALCIFICATION, LEFT: ICD-10-CM

## 2022-06-07 DIAGNOSIS — N60.92 ATYPICAL DUCTAL HYPERPLASIA OF LEFT BREAST: Primary | ICD-10-CM

## 2022-06-07 DIAGNOSIS — Z12.31 SCREENING MAMMOGRAM FOR BREAST CANCER: ICD-10-CM

## 2022-06-07 PROCEDURE — 99213 OFFICE O/P EST LOW 20 MIN: CPT | Performed by: SURGERY

## 2022-06-07 PROCEDURE — 96127 BRIEF EMOTIONAL/BEHAV ASSMT: CPT | Performed by: SURGERY

## 2022-06-07 PROCEDURE — 3077F SYST BP >= 140 MM HG: CPT | Performed by: SURGERY

## 2022-06-07 PROCEDURE — 3078F DIAST BP <80 MM HG: CPT | Performed by: SURGERY

## 2022-06-07 RX ORDER — ESCITALOPRAM OXALATE 10 MG/1
10 TABLET ORAL DAILY
COMMUNITY
Start: 2022-03-30

## 2023-06-27 DIAGNOSIS — Z12.31 SCREENING MAMMOGRAM FOR BREAST CANCER: Primary | ICD-10-CM

## 2023-06-30 ENCOUNTER — HOSPITAL ENCOUNTER (OUTPATIENT)
Dept: MAMMOGRAPHY | Age: 53
Discharge: HOME | End: 2023-06-30
Attending: SURGERY
Payer: COMMERCIAL

## 2023-06-30 DIAGNOSIS — Z12.31 SCREENING MAMMOGRAM FOR BREAST CANCER: ICD-10-CM

## 2023-06-30 PROCEDURE — 77063 BREAST TOMOSYNTHESIS BI: CPT | Performed by: SURGERY

## 2023-06-30 PROCEDURE — 77067 SCR MAMMO BI INCL CAD: CPT | Performed by: SURGERY

## 2023-11-14 RX ORDER — ASCORBIC ACID 500 MG
500 TABLET ORAL DAILY
COMMUNITY

## 2023-12-07 NOTE — H&P
[facility]    PATIENT'S NAME: Tomeka Lyons   ATTENDING PHYSICIAN: Louie Ferrer M.D. PATIENT ACCOUNT#:   [de-identified]    LOCATION:    MEDICAL RECORD #:   [id number]       YOB: 1970  ADMISSION DATE:       2023    HISTORY AND PHYSICAL EXAMINATION    CHIEF COMPLAINT:  Abnormal uterine bleeding. HISTORY OF PRESENT ILLNESS:  The patient is a 67-year-old female,  2, para 2-0-0-2, who was seen in the office with complaints of abnormal bleeding. The patient noted her symptoms were present for approximately 5 months. She complains of heavy periods and bleeding in between her periods. She notes on her heavy days she changes a super tampon every 2 hours. She does have clotting; the clots are quarter size. The patient had a GYN ultrasound saline infusion sonogram performed on 2023 which noted a 92 x 46 x 59 mm anteverted uterus, normal right adnexa, 2 small simple cysts in her left ovary with mean diameters of 9 mm and 12 mm. The endometrium measured 1.6 mm anteriorly and 1.69 mm posteriorly. There was an isoechoic structure extending off the anterior wall measuring 6 x 4 x 5 mm without vascularity, possible polyp versus wall irregularity, and there was also avascular hyperechoic area in the fundal aspect of the lining measuring 15 x 2 x 4, possibly consistent with calcification cluster, but other benign or malignant processes could not be excluded. These results were discussed with the patient, and recommendation of operative hysteroscopy discussed. Treatment versus observation and alternative forms of treatment discussed, and the patient presently desires to proceed with operative hysteroscopy with Uday Rios. PAST MEDICAL HISTORY:  Atypical ductal hyperplasia of her left breast diagnosed in 2018, treated with left breast lumpectomy; patient declined chemoprophylaxis and declines being followed with MRIs.     PAST SURGICAL HISTORY:  Breast biopsy in 2018, atypical ductal hyperplasia; 03/15/2018, left breast lumpectomy; 2018 hysteroscopy, benign pathology; wisdom teeth . MEDICATIONS:  Present medications:  Iron 325 mg 1 tab p.o. daily, Lexapro 10 mg 1 tab p.o. daily, multivitamin, vitamin C 500 mg extended release 1 tab p.o. daily, vitamin D 400 units 1 tab p.o. daily. ALLERGIES:  No known drug allergies. No latex allergies. FAMILY HISTORY:  Consistent with breast cancer in a maternal aunt, colon cancer in maternal grandmother diagnosed at 66 and paternal uncle age 77, liver cancer in a paternal uncle age 77, prostate cancer in maternal grandfather diagnosed at age [de-identified], sarcoma in her father. SOCIAL HISTORY:  Occasional EtOH, no tobacco, no drugs. GYNECOLOGICAL HISTORY:  Menarche age 16. History of regular menses every 28 days, lasting 6 days. No history of any sexually transmitted diseases. No history of an abnormal Pap smear. OBSTETRICAL HISTORY:  On 10/18/2004, 37 weeks, 6 pound 6 ounce female infant, normal vaginal delivery, no complications. She delivered her daughter, Ancelmo Gil, at [facility].  On 2008, 40 weeks, 6 pound 12 ounce male infant, normal vaginal delivery, no complications. She delivered her son, Petra Hill, at [facility]:    VITAL SIGNS:  The patient is 5 feet 5 inches. She weighs approximately 142 pounds. Blood pressure 118/72. HEENT:  Within normal limits. LUNGS:  Clear. HEART:  Regular rate and rhythm. ABDOMEN:  Benign. EXTREMITIES:  No CCE. PELVIC:  BUS, external genitalia within normal limits. Vagina, normal mucosa. Cervix, no gross lesions. Bimanual, no palpable abnormal masses, nontender. ASSESSMENT:    3   A 52-year-old female,  2, para 2-0-0-2, with complaints of abnormal uterine bleeding.   2.   Abnormal saline infusion sonogram.  3.   History of atypical ductal hyperplasia of the left breast.    PLAN:  Patient is scheduled for operative hysteroscopy with Amanda Hoff. The procedure and risks of procedure were discussed in depth with the patient. Patient states she understands the procedure and risks of procedure and consents to the above. Alternative forms of treatment and observation were discussed, and patient declines. Preop labs done. Preop information given. Cytotec discussed. Cytotec prescription sent. Postop care discussed. Postop information given. All asked questions answered. The patient is to follow up for surgery on 12/08/2023 as scheduled or call as needed.     Dictated By Roxi Gonzalez M.D.  d: 12/07/2023 13:43:48  t: 12/07/2023 13:58:38  Job [id number]  [facility]/

## 2023-12-08 ENCOUNTER — ANESTHESIA EVENT (OUTPATIENT)
Dept: SURGERY | Facility: HOSPITAL | Age: 53
End: 2023-12-08
Payer: COMMERCIAL

## 2023-12-08 ENCOUNTER — HOSPITAL ENCOUNTER (OUTPATIENT)
Facility: HOSPITAL | Age: 53
Setting detail: HOSPITAL OUTPATIENT SURGERY
Discharge: HOME | End: 2023-12-08
Attending: OBSTETRICS & GYNECOLOGY | Admitting: OBSTETRICS & GYNECOLOGY
Payer: COMMERCIAL

## 2023-12-08 ENCOUNTER — ANESTHESIA (OUTPATIENT)
Dept: SURGERY | Facility: HOSPITAL | Age: 53
End: 2023-12-08
Payer: COMMERCIAL

## 2023-12-08 VITALS
TEMPERATURE: 97 F | OXYGEN SATURATION: 100 % | RESPIRATION RATE: 15 BRPM | BODY MASS INDEX: 24.24 KG/M2 | SYSTOLIC BLOOD PRESSURE: 133 MMHG | DIASTOLIC BLOOD PRESSURE: 66 MMHG | HEART RATE: 40 BPM | HEIGHT: 64 IN | WEIGHT: 142 LBS

## 2023-12-08 LAB — B-HCG UR QL: NEGATIVE

## 2023-12-08 PROCEDURE — 81025 URINE PREGNANCY TEST: CPT

## 2023-12-08 PROCEDURE — 88305 TISSUE EXAM BY PATHOLOGIST: CPT | Performed by: OBSTETRICS & GYNECOLOGY

## 2023-12-08 PROCEDURE — 0UB98ZX EXCISION OF UTERUS, VIA NATURAL OR ARTIFICIAL OPENING ENDOSCOPIC, DIAGNOSTIC: ICD-10-PCS | Performed by: OBSTETRICS & GYNECOLOGY

## 2023-12-08 RX ORDER — CEFAZOLIN SODIUM 1 G/3ML
2 INJECTION, POWDER, FOR SOLUTION INTRAMUSCULAR; INTRAVENOUS ONCE
Status: COMPLETED | OUTPATIENT
Start: 2023-12-08 | End: 2023-12-08

## 2023-12-08 RX ORDER — ONDANSETRON 2 MG/ML
INJECTION INTRAMUSCULAR; INTRAVENOUS AS NEEDED
Status: DISCONTINUED | OUTPATIENT
Start: 2023-12-08 | End: 2023-12-08 | Stop reason: SURG

## 2023-12-08 RX ORDER — HYDROMORPHONE HYDROCHLORIDE 1 MG/ML
0.2 INJECTION, SOLUTION INTRAMUSCULAR; INTRAVENOUS; SUBCUTANEOUS EVERY 5 MIN PRN
Status: DISCONTINUED | OUTPATIENT
Start: 2023-12-08 | End: 2023-12-08

## 2023-12-08 RX ORDER — HYDROMORPHONE HYDROCHLORIDE 1 MG/ML
0.6 INJECTION, SOLUTION INTRAMUSCULAR; INTRAVENOUS; SUBCUTANEOUS EVERY 5 MIN PRN
Status: DISCONTINUED | OUTPATIENT
Start: 2023-12-08 | End: 2023-12-08

## 2023-12-08 RX ORDER — ACETAMINOPHEN 500 MG
1000 TABLET ORAL ONCE
Status: DISCONTINUED | OUTPATIENT
Start: 2023-12-08 | End: 2023-12-08 | Stop reason: HOSPADM

## 2023-12-08 RX ORDER — MEPERIDINE HYDROCHLORIDE 25 MG/ML
12.5 INJECTION INTRAMUSCULAR; INTRAVENOUS; SUBCUTANEOUS AS NEEDED
Status: DISCONTINUED | OUTPATIENT
Start: 2023-12-08 | End: 2023-12-08

## 2023-12-08 RX ORDER — KETOROLAC TROMETHAMINE 30 MG/ML
INJECTION, SOLUTION INTRAMUSCULAR; INTRAVENOUS AS NEEDED
Status: DISCONTINUED | OUTPATIENT
Start: 2023-12-08 | End: 2023-12-08 | Stop reason: SURG

## 2023-12-08 RX ORDER — SODIUM CHLORIDE, SODIUM LACTATE, POTASSIUM CHLORIDE, CALCIUM CHLORIDE 600; 310; 30; 20 MG/100ML; MG/100ML; MG/100ML; MG/100ML
INJECTION, SOLUTION INTRAVENOUS CONTINUOUS
Status: DISCONTINUED | OUTPATIENT
Start: 2023-12-08 | End: 2023-12-08

## 2023-12-08 RX ORDER — SCOLOPAMINE TRANSDERMAL SYSTEM 1 MG/1
1 PATCH, EXTENDED RELEASE TRANSDERMAL ONCE
Status: DISCONTINUED | OUTPATIENT
Start: 2023-12-08 | End: 2023-12-08 | Stop reason: HOSPADM

## 2023-12-08 RX ORDER — NALOXONE HYDROCHLORIDE 0.4 MG/ML
0.08 INJECTION, SOLUTION INTRAMUSCULAR; INTRAVENOUS; SUBCUTANEOUS AS NEEDED
Status: DISCONTINUED | OUTPATIENT
Start: 2023-12-08 | End: 2023-12-08

## 2023-12-08 RX ORDER — LIDOCAINE HYDROCHLORIDE 10 MG/ML
INJECTION, SOLUTION INFILTRATION; PERINEURAL AS NEEDED
Status: DISCONTINUED | OUTPATIENT
Start: 2023-12-08 | End: 2023-12-08 | Stop reason: HOSPADM

## 2023-12-08 RX ORDER — VASOPRESSIN 20 U/ML
INJECTION PARENTERAL AS NEEDED
Status: DISCONTINUED | OUTPATIENT
Start: 2023-12-08 | End: 2023-12-08 | Stop reason: HOSPADM

## 2023-12-08 RX ORDER — HYDROMORPHONE HYDROCHLORIDE 1 MG/ML
0.4 INJECTION, SOLUTION INTRAMUSCULAR; INTRAVENOUS; SUBCUTANEOUS EVERY 5 MIN PRN
Status: DISCONTINUED | OUTPATIENT
Start: 2023-12-08 | End: 2023-12-08

## 2023-12-08 RX ORDER — DEXAMETHASONE SODIUM PHOSPHATE 4 MG/ML
VIAL (ML) INJECTION AS NEEDED
Status: DISCONTINUED | OUTPATIENT
Start: 2023-12-08 | End: 2023-12-08 | Stop reason: SURG

## 2023-12-08 RX ORDER — DIPHENHYDRAMINE HYDROCHLORIDE 50 MG/ML
12.5 INJECTION INTRAMUSCULAR; INTRAVENOUS AS NEEDED
Status: DISCONTINUED | OUTPATIENT
Start: 2023-12-08 | End: 2023-12-08

## 2023-12-08 RX ORDER — ONDANSETRON 2 MG/ML
4 INJECTION INTRAMUSCULAR; INTRAVENOUS EVERY 6 HOURS PRN
Status: DISCONTINUED | OUTPATIENT
Start: 2023-12-08 | End: 2023-12-08

## 2023-12-08 RX ORDER — ACETAMINOPHEN 500 MG
1000 TABLET ORAL ONCE AS NEEDED
Status: DISCONTINUED | OUTPATIENT
Start: 2023-12-08 | End: 2023-12-08

## 2023-12-08 RX ORDER — GLYCOPYRROLATE 0.2 MG/ML
INJECTION, SOLUTION INTRAMUSCULAR; INTRAVENOUS AS NEEDED
Status: DISCONTINUED | OUTPATIENT
Start: 2023-12-08 | End: 2023-12-08 | Stop reason: SURG

## 2023-12-08 RX ORDER — HYDROCODONE BITARTRATE AND ACETAMINOPHEN 5; 325 MG/1; MG/1
1 TABLET ORAL ONCE AS NEEDED
Status: DISCONTINUED | OUTPATIENT
Start: 2023-12-08 | End: 2023-12-08

## 2023-12-08 RX ORDER — CEFAZOLIN SODIUM/WATER 2 G/20 ML
SYRINGE (ML) INTRAVENOUS
Status: DISCONTINUED
Start: 2023-12-08 | End: 2023-12-08

## 2023-12-08 RX ORDER — HYDROCODONE BITARTRATE AND ACETAMINOPHEN 5; 325 MG/1; MG/1
2 TABLET ORAL ONCE AS NEEDED
Status: DISCONTINUED | OUTPATIENT
Start: 2023-12-08 | End: 2023-12-08

## 2023-12-08 RX ORDER — LIDOCAINE HYDROCHLORIDE 10 MG/ML
INJECTION, SOLUTION EPIDURAL; INFILTRATION; INTRACAUDAL; PERINEURAL AS NEEDED
Status: DISCONTINUED | OUTPATIENT
Start: 2023-12-08 | End: 2023-12-08 | Stop reason: SURG

## 2023-12-08 RX ORDER — PROCHLORPERAZINE EDISYLATE 5 MG/ML
5 INJECTION INTRAMUSCULAR; INTRAVENOUS EVERY 8 HOURS PRN
Status: DISCONTINUED | OUTPATIENT
Start: 2023-12-08 | End: 2023-12-08

## 2023-12-08 RX ADMIN — SODIUM CHLORIDE, SODIUM LACTATE, POTASSIUM CHLORIDE, CALCIUM CHLORIDE: 600; 310; 30; 20 INJECTION, SOLUTION INTRAVENOUS at 08:19:00

## 2023-12-08 RX ADMIN — DEXAMETHASONE SODIUM PHOSPHATE 4 MG: 4 MG/ML VIAL (ML) INJECTION at 08:22:00

## 2023-12-08 RX ADMIN — LIDOCAINE HYDROCHLORIDE 50 MG: 10 INJECTION, SOLUTION EPIDURAL; INFILTRATION; INTRACAUDAL; PERINEURAL at 08:22:00

## 2023-12-08 RX ADMIN — CEFAZOLIN SODIUM 2 G: 1 INJECTION, POWDER, FOR SOLUTION INTRAMUSCULAR; INTRAVENOUS at 08:29:00

## 2023-12-08 RX ADMIN — GLYCOPYRROLATE 0.1 MG: 0.2 INJECTION, SOLUTION INTRAMUSCULAR; INTRAVENOUS at 08:38:00

## 2023-12-08 RX ADMIN — KETOROLAC TROMETHAMINE 30 MG: 30 INJECTION, SOLUTION INTRAMUSCULAR; INTRAVENOUS at 08:57:00

## 2023-12-08 RX ADMIN — ONDANSETRON 4 MG: 2 INJECTION INTRAMUSCULAR; INTRAVENOUS at 08:57:00

## 2023-12-08 NOTE — BRIEF OP NOTE
[facility]  Post-Op Procedure Note    Jenelle Herndon Patient Status:  Hospital Outpatient Surgery    1970 MRN [id number]   [facility] SURGERY Attending Evangelina Peterson MD   Hosp Day # 0 PCP Concepcion PRIEST     Preoperative Diagnosis: ABNORMAL UTERINE BLEEDING  Postoperative Diagnosis: ABNORMAL UTERINE BLEEDING  Procedure: OPERATIVE HYSTEROSCOPY POSSIBLE POLYPECTOMY WITH Thena Marrow     Primary surgeon: Alex Colon MD  Surgical Findings: Endometrial polyp, otherwise noemal cavity and ostia ilaterally  Anesthesia: MAC-Dr Sangita Hsu  Anesthesiologist: Yamilka Orr MD  Antibiotics:   Anti-Infectives            ceFAZolin (Ancef) injection 2 g (Completed)    ceFAZolin (Ancef) 2 g/20mL IV syringe premix          IV Fluids: 400 ml  Hysteroscopic Deficit: 350 ml  Urine Output:   120 ml   Estimated blood loss: 10 ml  Counts: Correct  Specimen: Endometrial polyps and endometrial biopsies  Complications: None. Patient tolerated the procedure well. Condition: To same day surgery in stable condition.        Alex Colon MD   2023  8:54 AM

## 2023-12-08 NOTE — DISCHARGE INSTRUCTIONS
Discharge Instructions for Hysteroscopy  Dr Yenifer Velazquez  Initial 24 hours post-procedure: Take it easy. If possible, stay at home. Have someone at home to assist you or to report any problems or symptoms that you may have. You may feel weak, dizzy, or lightheaded from the anesthesia. If so, stay in bed. Avoid stairs. If you must use stairs, be sure to have assistance. You may have cramping and/or lower abdominal pain. If so, take 2 to 3 Advil every 4 - 6 hours. If the pain persists or worsens, call our office immediately. General Post Procedure: Take your temperature once a day for the next 7 days. If your temperature exceeds 100.4, call our office immediately. You may have spotting or discharge for the next few days. Spotting or discharge may continue for a couple of weeks. You may pass an occasional blood clot. If you continue to pass numerous large clothes, call our office immediately. If you experience nausea, vomiting, or bloating, or if you are unable to eat or unable to pass gas, call our office immediately. Do not douche, use tampons, or have intercourse for 10 days. Please call our office to schedule your post-op appointment for 7 - 10 days post procedure, with the physician who performed your Hysteroscopy or, if you have any questions or concerns prior to your appointment, [phone].     You received a drug called Toradol which in an anti-inflammatory at 9:00 am  If you are allowed to take anti-inflammatories,  Do not take Motrin, Advil, Aleve, or ibuprofen until : 3:00 pm

## 2024-09-09 ENCOUNTER — HOSPITAL ENCOUNTER (OUTPATIENT)
Dept: CV DIAGNOSTICS | Facility: HOSPITAL | Age: 54
Discharge: HOME | End: 2024-09-09
Attending: FAMILY MEDICINE
Payer: COMMERCIAL

## 2024-09-09 DIAGNOSIS — R00.2 HEART PALPITATIONS: ICD-10-CM

## 2024-09-09 PROCEDURE — 93270 REMOTE 30 DAY ECG REV/REPORT: CPT | Performed by: FAMILY MEDICINE

## 2024-09-09 PROCEDURE — 93271 ECG/MONITORING AND ANALYSIS: CPT | Performed by: FAMILY MEDICINE

## 2024-11-05 ENCOUNTER — HOSPITAL ENCOUNTER (OUTPATIENT)
Dept: MAMMOGRAPHY | Age: 54
Discharge: HOME | End: 2024-11-05
Attending: OBSTETRICS & GYNECOLOGY
Payer: COMMERCIAL

## 2024-11-05 DIAGNOSIS — Z12.31 SCREENING MAMMOGRAM FOR BREAST CANCER: ICD-10-CM

## 2024-11-05 PROCEDURE — 77067 SCR MAMMO BI INCL CAD: CPT | Performed by: OBSTETRICS & GYNECOLOGY

## 2024-11-05 PROCEDURE — 77063 BREAST TOMOSYNTHESIS BI: CPT | Performed by: OBSTETRICS & GYNECOLOGY

## (undated) DEVICE — 2000CC GUARDIAN II: Brand: GUARDIAN

## (undated) DEVICE — SYRINGE 10ML LL TIP

## (undated) DEVICE — GLOVE SURG SENSICARE SZ 5-1/2

## (undated) DEVICE — GLOVE SURG TRIUMPH SZ 6-1/2

## (undated) DEVICE — ELITE HYSTEROSCOPE SEAL: Brand: TRUCLEAR

## (undated) DEVICE — SUTURE MONOCRYL 4-0 PS-2

## (undated) DEVICE — SET TB INFLO FOR TRUCLEAR SYS HYSTEROLUX

## (undated) DEVICE — SPECIMEN SOCK - STANDARD: Brand: MEDI-VAC

## (undated) DEVICE — HYSTEROSCOPIC OUTFLOW TUBE SET

## (undated) DEVICE — SOL  .9 3000ML

## (undated) DEVICE — BREAST-HERNIA-PORT CDS-LF: Brand: MEDLINE INDUSTRIES, INC.

## (undated) DEVICE — DEV REMOVAL TRUCLEAR SFT MINI

## (undated) DEVICE — KENDALL SCD EXPRESS SLEEVES, KNEE LENGTH, MEDIUM: Brand: KENDALL SCD

## (undated) DEVICE — HYSTEROSCOPIC INFLOW TUBE SET

## (undated) DEVICE — 1010 S-DRAPE TOWEL DRAPE 10/BX: Brand: STERI-DRAPE™

## (undated) DEVICE — SUTURE VICRYL 3-0 SH

## (undated) DEVICE — GYN CDS: Brand: MEDLINE INDUSTRIES, INC.

## (undated) DEVICE — PREMIUM WET SKIN PREP TRAY: Brand: MEDLINE INDUSTRIES, INC.

## (undated) DEVICE — NEEDLE SPNL 22GA L3.5IN BLK HUB SS RW FIT

## (undated) DEVICE — DRAPE PACK CHEST & U BAR

## (undated) DEVICE — UNDERPAD INCNT 30X30IN PP HVY

## (undated) DEVICE — CAUTERY BLADE 2IN INS E1455

## (undated) DEVICE — GAUZE SPONGES,12 PLY: Brand: CURITY

## (undated) DEVICE — STERILE POLYISOPRENE POWDER-FREE SURGICAL GLOVES: Brand: PROTEXIS

## (undated) DEVICE — OUTFLOW HYSTER S&N

## (undated) DEVICE — SOLUTION IRRIG 1000ML 0.9% NACL USP BTL

## (undated) DEVICE — DRAPE UTIL L W18XL24IN PLAS STR ADH REINF

## (undated) DEVICE — ABDOMINAL PAD: Brand: DERMACEA

## (undated) DEVICE — SUTURE SILK 2-0 FS

## (undated) DEVICE — GOWN,SIRUS,FABRIC-REINFORCED,LARGE: Brand: MEDLINE

## (undated) DEVICE — HEMOCLIP HORIZON SM 001200

## (undated) DEVICE — MEDI-VAC NON-CONDUCTIVE SUCTION TUBING: Brand: CARDINAL HEALTH

## (undated) DEVICE — TOWEL: OR BLU 80/CS: Brand: MEDICAL ACTION INDUSTRIES

## (undated) DEVICE — HEMOCLIP HORIZON MED 002200

## (undated) DEVICE — NEEDLE SPINAL 22X3-1/2 BLK

## (undated) DEVICE — DRAPE,TAPE STRIPS,STERILE: Brand: MEDLINE

## (undated) DEVICE — SOFT TISSUE SHAVER MINI: Brand: TRUCLEAR

## (undated) DEVICE — CONT SPEC SURG FAXITRON WEDGE

## (undated) DEVICE — SLEEVE COMPR M KNEE LEN SGL USE KENDALL SCD

## (undated) DEVICE — SOL  .9 1000ML BTL

## (undated) DEVICE — SOLUTION IRRIG 3000ML 0.9% NACL FLX CONT

## (undated) NOTE — LETTER
September 27, 2018    Olinda Peralta  [address] [place] [address]      Dear Santosh Holiday: The following are the results of your recent tests. Please review the list of test results.   Your result is the value on the left; we have also supplied the range

## (undated) NOTE — LETTER
[facility]  Phone: [phone]  OUTSIDE TESTING RESULT REQUEST      TO:   Dr Berkowitz [place] Date: 5/22/18    FAX #: [phone]     IMPORTANT: FOR YOUR IMMEDIATE ATTENTION  Please FAX all test results listed below to: [phone]